# Patient Record
Sex: MALE | Race: WHITE | HISPANIC OR LATINO | Employment: UNEMPLOYED | URBAN - METROPOLITAN AREA
[De-identification: names, ages, dates, MRNs, and addresses within clinical notes are randomized per-mention and may not be internally consistent; named-entity substitution may affect disease eponyms.]

---

## 2019-05-17 ENCOUNTER — HOSPITAL ENCOUNTER (EMERGENCY)
Facility: MEDICAL CENTER | Age: 24
End: 2019-05-17
Attending: EMERGENCY MEDICINE
Payer: MEDICAID

## 2019-05-17 VITALS
HEART RATE: 80 BPM | DIASTOLIC BLOOD PRESSURE: 82 MMHG | WEIGHT: 163.14 LBS | BODY MASS INDEX: 24.16 KG/M2 | OXYGEN SATURATION: 94 % | RESPIRATION RATE: 16 BRPM | TEMPERATURE: 98.6 F | HEIGHT: 69 IN | SYSTOLIC BLOOD PRESSURE: 115 MMHG

## 2019-05-17 DIAGNOSIS — R30.0 DYSURIA: ICD-10-CM

## 2019-05-17 DIAGNOSIS — Z20.2 STD EXPOSURE: ICD-10-CM

## 2019-05-17 PROCEDURE — 96372 THER/PROPH/DIAG INJ SC/IM: CPT

## 2019-05-17 PROCEDURE — 700102 HCHG RX REV CODE 250 W/ 637 OVERRIDE(OP): Performed by: EMERGENCY MEDICINE

## 2019-05-17 PROCEDURE — 700101 HCHG RX REV CODE 250: Performed by: EMERGENCY MEDICINE

## 2019-05-17 PROCEDURE — A9270 NON-COVERED ITEM OR SERVICE: HCPCS | Performed by: EMERGENCY MEDICINE

## 2019-05-17 PROCEDURE — 700111 HCHG RX REV CODE 636 W/ 250 OVERRIDE (IP): Performed by: EMERGENCY MEDICINE

## 2019-05-17 PROCEDURE — 87591 N.GONORRHOEAE DNA AMP PROB: CPT

## 2019-05-17 PROCEDURE — 99284 EMERGENCY DEPT VISIT MOD MDM: CPT

## 2019-05-17 PROCEDURE — 87491 CHLMYD TRACH DNA AMP PROBE: CPT

## 2019-05-17 RX ORDER — CEFTRIAXONE SODIUM 250 MG/1
250 INJECTION, POWDER, FOR SOLUTION INTRAMUSCULAR; INTRAVENOUS ONCE
Status: COMPLETED | OUTPATIENT
Start: 2019-05-17 | End: 2019-05-17

## 2019-05-17 RX ORDER — METRONIDAZOLE 500 MG/1
2000 TABLET ORAL ONCE
Status: COMPLETED | OUTPATIENT
Start: 2019-05-17 | End: 2019-05-17

## 2019-05-17 RX ORDER — AZITHROMYCIN 250 MG/1
1000 TABLET, FILM COATED ORAL ONCE
Status: COMPLETED | OUTPATIENT
Start: 2019-05-17 | End: 2019-05-17

## 2019-05-17 RX ADMIN — CEFTRIAXONE SODIUM 250 MG: 250 INJECTION, POWDER, FOR SOLUTION INTRAMUSCULAR; INTRAVENOUS at 18:53

## 2019-05-17 RX ADMIN — METRONIDAZOLE 2000 MG: 500 TABLET, FILM COATED ORAL at 18:53

## 2019-05-17 RX ADMIN — LIDOCAINE HYDROCHLORIDE 0.9 ML: 10 INJECTION, SOLUTION INFILTRATION; PERINEURAL at 18:54

## 2019-05-17 RX ADMIN — AZITHROMYCIN 1000 MG: 250 TABLET, FILM COATED ORAL at 18:53

## 2019-05-17 ASSESSMENT — LIFESTYLE VARIABLES: DO YOU DRINK ALCOHOL: NO

## 2019-05-18 LAB
C TRACH DNA SPEC QL NAA+PROBE: NEGATIVE
N GONORRHOEA DNA SPEC QL NAA+PROBE: NEGATIVE
SPECIMEN SOURCE: NORMAL

## 2019-05-18 NOTE — ED TRIAGE NOTES
Chief Complaint   Patient presents with   • Exposure to STD     Pt reports to have been exposed to STD 1 wk ago, painful urination today   • Dysuria       Explained to pt triage process, made pt aware to tell this RN/staff of any changes/concerns, pt verbalized understanding of process and instructions given. Pt to [facility].
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
## 2019-05-29 ENCOUNTER — APPOINTMENT (OUTPATIENT)
Dept: RADIOLOGY | Facility: MEDICAL CENTER | Age: 24
End: 2019-05-29
Attending: EMERGENCY MEDICINE
Payer: MEDICAID

## 2019-05-29 ENCOUNTER — HOSPITAL ENCOUNTER (EMERGENCY)
Facility: MEDICAL CENTER | Age: 24
End: 2019-05-30
Attending: EMERGENCY MEDICINE
Payer: MEDICAID

## 2019-05-29 VITALS
DIASTOLIC BLOOD PRESSURE: 98 MMHG | HEART RATE: 72 BPM | TEMPERATURE: 98.8 F | WEIGHT: 165 LBS | OXYGEN SATURATION: 96 % | HEIGHT: 69 IN | RESPIRATION RATE: 16 BRPM | SYSTOLIC BLOOD PRESSURE: 137 MMHG | BODY MASS INDEX: 24.44 KG/M2

## 2019-05-29 DIAGNOSIS — S00.83XA TRAUMATIC HEMATOMA OF FOREHEAD, INITIAL ENCOUNTER: ICD-10-CM
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
## 2019-05-29 DIAGNOSIS — S09.90XA CLOSED HEAD INJURY, INITIAL ENCOUNTER: ICD-10-CM

## 2019-05-29 DIAGNOSIS — S93.401A SPRAIN OF RIGHT ANKLE, UNSPECIFIED LIGAMENT, INITIAL ENCOUNTER: ICD-10-CM

## 2019-05-29 PROCEDURE — 99285 EMERGENCY DEPT VISIT HI MDM: CPT

## 2019-05-29 PROCEDURE — 70450 CT HEAD/BRAIN W/O DYE: CPT

## 2019-05-29 PROCEDURE — 700102 HCHG RX REV CODE 250 W/ 637 OVERRIDE(OP): Performed by: EMERGENCY MEDICINE

## 2019-05-29 PROCEDURE — A9270 NON-COVERED ITEM OR SERVICE: HCPCS | Performed by: EMERGENCY MEDICINE

## 2019-05-29 PROCEDURE — 73610 X-RAY EXAM OF ANKLE: CPT | Mod: RT

## 2019-05-29 RX ORDER — IBUPROFEN 600 MG/1
600 TABLET ORAL ONCE
Status: COMPLETED | OUTPATIENT
Start: 2019-05-29 | End: 2019-05-29

## 2019-05-29 RX ORDER — CYCLOBENZAPRINE HCL 10 MG
10 TABLET ORAL 3 TIMES DAILY PRN
Qty: 15 TAB | Refills: 0 | Status: SHIPPED | OUTPATIENT
Start: 2019-05-29 | End: 2019-06-08

## 2019-05-29 RX ORDER — LORATADINE 10 MG/1
10 TABLET ORAL DAILY
COMMUNITY

## 2019-05-29 RX ORDER — CYCLOBENZAPRINE HCL 10 MG
10 TABLET ORAL ONCE
Status: COMPLETED | OUTPATIENT
Start: 2019-05-29 | End: 2019-05-29

## 2019-05-29 RX ORDER — IBUPROFEN 600 MG/1
600 TABLET ORAL EVERY 6 HOURS PRN
Qty: 20 TAB | Refills: 0 | Status: SHIPPED | OUTPATIENT
Start: 2019-05-29

## 2019-05-29 RX ADMIN — CYCLOBENZAPRINE HYDROCHLORIDE 10 MG: 10 TABLET, FILM COATED ORAL at 22:34

## 2019-05-29 RX ADMIN — IBUPROFEN 600 MG: 600 TABLET ORAL at 22:34

## 2019-05-30 PROCEDURE — 99285 EMERGENCY DEPT VISIT HI MDM: CPT

## 2019-05-30 NOTE — ED NOTES
D/c inst reviewed w/the pt to include pain management ice/heat, rx x 2.  The pt was given a work note for 3 days.  The pt asked for crutches and an ace wrap.  Provided.  The pt also asked for a w/c out.  Provided. The pt was asst out to the pov in a w/c.
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
## 2019-06-08 ENCOUNTER — HOSPITAL ENCOUNTER (EMERGENCY)
Facility: MEDICAL CENTER | Age: 24
End: 2019-06-08
Attending: EMERGENCY MEDICINE
Payer: MEDICAID

## 2019-06-08 VITALS
DIASTOLIC BLOOD PRESSURE: 85 MMHG | HEIGHT: 69 IN | HEART RATE: 84 BPM | WEIGHT: 163.14 LBS | OXYGEN SATURATION: 99 % | SYSTOLIC BLOOD PRESSURE: 129 MMHG | BODY MASS INDEX: 24.16 KG/M2 | TEMPERATURE: 97.8 F | RESPIRATION RATE: 16 BRPM

## 2019-06-08 DIAGNOSIS — N39.0 ACUTE UTI: ICD-10-CM

## 2019-06-08 DIAGNOSIS — N34.2 URETHRITIS: ICD-10-CM

## 2019-06-08 LAB
APPEARANCE UR: CLEAR
BACTERIA #/AREA URNS HPF: ABNORMAL /HPF
BILIRUB UR QL STRIP.AUTO: NEGATIVE
COLOR UR: YELLOW
GLUCOSE UR STRIP.AUTO-MCNC: NEGATIVE MG/DL
KETONES UR STRIP.AUTO-MCNC: NEGATIVE MG/DL
LEUKOCYTE ESTERASE UR QL STRIP.AUTO: NEGATIVE
MICRO URNS: ABNORMAL
MUCOUS THREADS #/AREA URNS HPF: ABNORMAL /HPF
NITRITE UR QL STRIP.AUTO: NEGATIVE
PH UR STRIP.AUTO: 6 [PH]
PROT UR QL STRIP: NEGATIVE MG/DL
RBC # URNS HPF: ABNORMAL /HPF
RBC UR QL AUTO: ABNORMAL
SP GR UR STRIP.AUTO: 1.02
WBC #/AREA URNS HPF: ABNORMAL /HPF

## 2019-06-08 PROCEDURE — 700111 HCHG RX REV CODE 636 W/ 250 OVERRIDE (IP): Performed by: EMERGENCY MEDICINE

## 2019-06-08 PROCEDURE — 81001 URINALYSIS AUTO W/SCOPE: CPT

## 2019-06-08 PROCEDURE — 700102 HCHG RX REV CODE 250 W/ 637 OVERRIDE(OP): Performed by: EMERGENCY MEDICINE

## 2019-06-08 PROCEDURE — 96372 THER/PROPH/DIAG INJ SC/IM: CPT

## 2019-06-08 PROCEDURE — A9270 NON-COVERED ITEM OR SERVICE: HCPCS | Performed by: EMERGENCY MEDICINE

## 2019-06-08 PROCEDURE — 99284 EMERGENCY DEPT VISIT MOD MDM: CPT

## 2019-06-08 PROCEDURE — 87591 N.GONORRHOEAE DNA AMP PROB: CPT

## 2019-06-08 PROCEDURE — 87491 CHLMYD TRACH DNA AMP PROBE: CPT

## 2019-06-08 RX ORDER — SULFAMETHOXAZOLE AND TRIMETHOPRIM 800; 160 MG/1; MG/1
1 TABLET ORAL 2 TIMES DAILY
Qty: 20 TAB | Refills: 0 | Status: SHIPPED | OUTPATIENT
Start: 2019-06-08 | End: 2019-06-18

## 2019-06-08 RX ORDER — CEFTRIAXONE 500 MG/1
250 INJECTION, POWDER, FOR SOLUTION INTRAMUSCULAR; INTRAVENOUS ONCE
Status: COMPLETED | OUTPATIENT
Start: 2019-06-08 | End: 2019-06-08

## 2019-06-08 RX ORDER — AZITHROMYCIN 250 MG/1
1000 TABLET, FILM COATED ORAL ONCE
Status: COMPLETED | OUTPATIENT
Start: 2019-06-08 | End: 2019-06-08

## 2019-06-08 RX ADMIN — CEFTRIAXONE SODIUM 250 MG: 500 INJECTION, POWDER, FOR SOLUTION INTRAMUSCULAR; INTRAVENOUS at 11:52

## 2019-06-08 RX ADMIN — AZITHROMYCIN 1000 MG: 250 TABLET, FILM COATED ORAL at 11:53

## 2019-06-08 ASSESSMENT — LIFESTYLE VARIABLES: DO YOU DRINK ALCOHOL: NO

## 2019-06-08 NOTE — ED NOTES
Discharge information reviewed in detail. Patient verbalized understanding of discharge instructions to follow up with [facility] and to return to ER if condition worsens.   Patient educated on one new prescription(s). Self to drive home.   Patient ambulated out of ER in a steady gait.

## 2019-06-08 NOTE — ED NOTES
"This pt was seen at [facility] the 17 th of last month and treated for STD exposure.  He returns today complaining of continued penile pain and discharge.   Chief Complaint   Patient presents with   • Exposure to STD     /87   Pulse (!) 101   Temp 36.9 °C (98.4 °F) (Temporal)   Resp 18   Ht 1.753 m (5' 9\")   Wt 74 kg (163 lb 2.3 oz)   SpO2 97%   BMI 24.09 kg/m²     "

## 2019-06-08 NOTE — ED PROVIDER NOTES
"ED Provider Note    CHIEF COMPLAINT  Chief Complaint   Patient presents with   • Exposure to STD       HPI  Babak Barnhart is a 24 y.o. male who presents for evaluation of dysuria and penile discharge, initially had the symptoms earlier last month, he came the emergency department and was treated for GC/chlamydia but ultimately had that test come back negative.  Since then he has engaged in unprotected intercourse with a new female partner, he states that his symptoms never fully resolved although did improve significantly only to worsen again.  No abdominal pain, no fever.  He offers no other complaints    REVIEW OF SYSTEMS  Negative for fever, abdominal pain.    PAST MEDICAL HISTORY       SOCIAL HISTORY  Social History     Social History Main Topics   • Smoking status: Never Smoker   • Smokeless tobacco: Never Used   • Alcohol use Yes      Comment: Occasionally   • Drug use: No   • Sexual activity: Not on file       SURGICAL HISTORY  patient denies any surgical history    CURRENT MEDICATIONS  I personally reviewed the medication list in the charting documentation.     ALLERGIES  Allergies   Allergen Reactions   • Amoxicillin Hives     Hives on his hands        PHYSICAL EXAM  VITAL SIGNS: /87   Pulse (!) 101   Temp 36.9 °C (98.4 °F) (Temporal)   Resp 18   Ht 1.753 m (5' 9\")   Wt 74 kg (163 lb 2.3 oz)   SpO2 97%   BMI 24.09 kg/m²   Constitutional: Alert in no apparent distress.  HENT: No signs of trauma.   Eyes: Conjunctiva normal, Non-icteric.   Chest: Normal nonlabored respirations  Skin: No erythema, No rash.   : Unremarkable external genitalia  Musculoskeletal: Good range of motion in all major joints.   Neurologic: Alert, No focal deficits noted.   Psychiatric: Affect normal, Judgment normal.    DIAGNOSTIC STUDIES / PROCEDURES    LABS/EKGs  Results for orders placed or performed during the hospital encounter of 06/08/19   URINALYSIS,CULTURE IF INDICATED   Result Value Ref Range    " Color Yellow     Character Clear     Specific Gravity 1.020 <1.035    Ph 6.0 5.0 - 8.0    Glucose Negative Negative mg/dL    Ketones Negative Negative mg/dL    Protein Negative Negative mg/dL    Bilirubin Negative Negative    Nitrite Negative Negative    Leukocyte Esterase Negative Negative    Occult Blood Trace (A) Negative    Micro Urine Req Microscopic    URINE MICROSCOPIC (W/UA)   Result Value Ref Range    WBC 5-10 (A) /hpf    RBC 2-5 (A) /hpf    Bacteria Few (A) None /hpf    Mucous Threads Few /hpf   Chlamydia/GC PCR Urine Or Swab   Result Value Ref Range    Source Urine         COURSE & MEDICAL DECISION MAKING  Pertinent Labs & Imaging studies reviewed. (See chart for details)    Encounter Summary: This is a 24 y.o. male with a return of his dysuria and penile discharge, was tested negative for GC/chlamydia last month and was treated, states improvement in his symptoms only to return again but he has been engaging in unprotected intercourse with new female partners.  At this point I will treat him again, test him again as well as expand the urine test to include a full urinalysis to rule out simple cystitis as well.  Urinalysis is concerning for UTI with elevated W BC's and some bacteria, will be treated with Bactrim, he will be instructed to follow-up with the urologist if he does not improve.  Strict return instructions discussed.      DISPOSITION: Discharge Home      FINAL IMPRESSION  1. Urethritis    2. Acute UTI        This dictation was created using voice recognition software. The accuracy of the dictation is limited to the abilities of the software. I expect there may be some errors of grammar and possibly content. The nursing notes were reviewed and certain aspects of this information were incorporated into this note.    Electronically signed by: Juanjose Menendez, 6/8/2019 11:21 AM

## 2020-03-01 ENCOUNTER — HOSPITAL ENCOUNTER (EMERGENCY)
Facility: MEDICAL CENTER | Age: 25
End: 2020-03-01
Attending: EMERGENCY MEDICINE
Payer: MEDICAID

## 2020-03-01 VITALS
WEIGHT: 165.34 LBS | RESPIRATION RATE: 16 BRPM | BODY MASS INDEX: 23.67 KG/M2 | HEART RATE: 74 BPM | DIASTOLIC BLOOD PRESSURE: 76 MMHG | OXYGEN SATURATION: 99 % | SYSTOLIC BLOOD PRESSURE: 137 MMHG | TEMPERATURE: 97.9 F | HEIGHT: 70 IN

## 2020-03-01 DIAGNOSIS — Z20.2 STD EXPOSURE: ICD-10-CM

## 2020-03-01 DIAGNOSIS — R30.0 DYSURIA: ICD-10-CM

## 2020-03-01 LAB
APPEARANCE UR: ABNORMAL
BACTERIA #/AREA URNS HPF: ABNORMAL /HPF
BILIRUB UR QL STRIP.AUTO: NEGATIVE
COLOR UR: YELLOW
GLUCOSE UR STRIP.AUTO-MCNC: NEGATIVE MG/DL
KETONES UR STRIP.AUTO-MCNC: NEGATIVE MG/DL
LEUKOCYTE ESTERASE UR QL STRIP.AUTO: ABNORMAL
MICRO URNS: ABNORMAL
MUCOUS THREADS #/AREA URNS HPF: ABNORMAL /HPF
NITRITE UR QL STRIP.AUTO: NEGATIVE
PH UR STRIP.AUTO: 7 [PH] (ref 5–8)
PROT UR QL STRIP: NEGATIVE MG/DL
RBC # URNS HPF: ABNORMAL /HPF
RBC UR QL AUTO: NEGATIVE
SP GR UR STRIP.AUTO: 1.02
WBC #/AREA URNS HPF: ABNORMAL /HPF

## 2020-03-01 PROCEDURE — 87591 N.GONORRHOEAE DNA AMP PROB: CPT

## 2020-03-01 PROCEDURE — 700101 HCHG RX REV CODE 250

## 2020-03-01 PROCEDURE — 700111 HCHG RX REV CODE 636 W/ 250 OVERRIDE (IP): Performed by: EMERGENCY MEDICINE

## 2020-03-01 PROCEDURE — 99284 EMERGENCY DEPT VISIT MOD MDM: CPT

## 2020-03-01 PROCEDURE — 700102 HCHG RX REV CODE 250 W/ 637 OVERRIDE(OP): Performed by: EMERGENCY MEDICINE

## 2020-03-01 PROCEDURE — 81001 URINALYSIS AUTO W/SCOPE: CPT

## 2020-03-01 PROCEDURE — 96372 THER/PROPH/DIAG INJ SC/IM: CPT

## 2020-03-01 PROCEDURE — 87491 CHLMYD TRACH DNA AMP PROBE: CPT

## 2020-03-01 PROCEDURE — A9270 NON-COVERED ITEM OR SERVICE: HCPCS | Performed by: EMERGENCY MEDICINE

## 2020-03-01 RX ORDER — LIDOCAINE HYDROCHLORIDE 10 MG/ML
INJECTION, SOLUTION INFILTRATION; PERINEURAL
Status: COMPLETED
Start: 2020-03-01 | End: 2020-03-01

## 2020-03-01 RX ORDER — CEFTRIAXONE 500 MG/1
250 INJECTION, POWDER, FOR SOLUTION INTRAMUSCULAR; INTRAVENOUS ONCE
Status: COMPLETED | OUTPATIENT
Start: 2020-03-01 | End: 2020-03-01

## 2020-03-01 RX ORDER — AZITHROMYCIN 250 MG/1
1000 TABLET, FILM COATED ORAL ONCE
Status: COMPLETED | OUTPATIENT
Start: 2020-03-01 | End: 2020-03-01

## 2020-03-01 RX ADMIN — CEFTRIAXONE SODIUM 250 MG: 500 INJECTION, POWDER, FOR SOLUTION INTRAMUSCULAR; INTRAVENOUS at 14:30

## 2020-03-01 RX ADMIN — AZITHROMYCIN 1000 MG: 250 TABLET, FILM COATED ORAL at 14:30

## 2020-03-01 RX ADMIN — LIDOCAINE HYDROCHLORIDE 1 ML: 10 INJECTION, SOLUTION INFILTRATION; PERINEURAL at 14:30

## 2020-03-01 NOTE — ED TRIAGE NOTES
"Present with a hx of dysuria, and penile discharge (yellow) recurring for the past 2 days.  Chief Complaint   Patient presents with   • Exposure to STD   • Penile Discharge   • Painful Urination     /83   Pulse 68   Temp 36.6 °C (97.9 °F)   Resp 16   Ht 1.778 m (5' 10\")   Wt 75 kg (165 lb 5.5 oz)   BMI 23.72 kg/m²     "

## 2020-03-01 NOTE — LETTER
3/4/2020               Babak Remberto RoeAntwanshlomo Barnhart  [address] [place] [address]   [facility] [address]        Dear Babak (MR#[id number])    As we have been unable to contact you by phone, this letter is sent in regards to your, recent visit to the [facility] Emergency Department on 3/1/2020.  During the visit, tests were performed to assist the physician in a medical diagnosis.  A review of those tests requires that we notify you of the following:    Your culture test was positive for Gonorrhea, a sexually transmitted infection. This was already treated appropriately in the Emergency Department. .       Based on the above findings it is recommended that you avoid sexual activity until 7 days after treatment and seek testing for the presence of additional sexually transmitted infections from the Health Department. Also, it is advised that you inform your sexual partner(s) within the previous 60 days of the above findings and direct them to the Health Department for testing. Should your symptoms progress, it is important that you follow up with your primary care physician, your local urgent care office, or return to the emergency department for further work up in order to prevent long term health issues.      Thank you for your cooperation in the matter.    Sincerely,  ED Culture Follow-Up Staff  Tasha Werner, PharmD, PharmD    [facility], Emergency Department  [address], [place] [address] [phone] (ED Culture Line)  [phone]

## 2020-03-01 NOTE — DISCHARGE INSTRUCTIONS
Make sure you wear a condom when having sex.  I advise following up with the health department to make sure the infection has completely resolved.  Any persistent burning, develop a rash or sores or any other concerns return.  Your syphilis test is pending and we will contact you if you have abnormal results.
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
## 2020-03-02 LAB
C TRACH DNA SPEC QL NAA+PROBE: NEGATIVE
N GONORRHOEA DNA SPEC QL NAA+PROBE: POSITIVE
SPECIMEN SOURCE: ABNORMAL

## 2020-03-04 NOTE — ED NOTES
"ED Positive Culture Follow-up/Notification Note:    Date: 3/3/2020     Patient seen in the ED on 3/1/2020 for dysuria, penile discharge.   1. STD exposure    2. Dysuria       Discharge Medication List as of 3/1/2020  3:16 PM      Azithromycin and ceftriaxone administered in the ED.     Allergies: Amoxicillin     Vitals:    03/01/20 1342 03/01/20 1344 03/01/20 1500   BP:  131/83 137/76   Pulse:  68 74   Resp:  16 16   Temp:  36.6 °C (97.9 °F)    SpO2:  98% 99%   Weight: 75 kg (165 lb 5.5 oz)     Height: 1.778 m (5' 10\")         Final cultures:   Results     Procedure Component Value Units Date/Time    Chlamydia/GC PCR Urine Or Swab [[id number]]  (Abnormal) Collected:  03/01/20 1415    Order Status:  Completed Specimen:  Genital from Urine, First Catch Updated:  03/02/20 1829     C. trachomatis by PCR Negative     N. gonorrhoeae by PCR POSITIVE     Source Urine    URINALYSIS [[id number]]  (Abnormal) Collected:  03/01/20 1415    Order Status:  Completed Specimen:  Urine Updated:  03/01/20 1509     Color Yellow     Character Sl Cloudy     Specific Gravity 1.020     Ph 7.0     Glucose Negative mg/dL      Ketones Negative mg/dL      Protein Negative mg/dL      Bilirubin Negative     Nitrite Negative     Leukocyte Esterase Trace     Occult Blood Negative     Micro Urine Req Microscopic          Plan:   Patient already treated for Gonorrhea in the ED with ceftriaxone. Unable to reach patient by phone, sent letter to inform of result,  on obtaining additional screening, notifying partners within the past 60 days and abstinence until 7 days after treatment.     Tasha eWrner, PharmD    "

## 2020-05-17 ENCOUNTER — HOSPITAL ENCOUNTER (EMERGENCY)
Facility: MEDICAL CENTER | Age: 25
End: 2020-05-17
Attending: EMERGENCY MEDICINE
Payer: MEDICAID

## 2020-05-17 VITALS
HEART RATE: 71 BPM | HEIGHT: 69 IN | TEMPERATURE: 97.5 F | RESPIRATION RATE: 15 BRPM | BODY MASS INDEX: 24.49 KG/M2 | DIASTOLIC BLOOD PRESSURE: 91 MMHG | OXYGEN SATURATION: 98 % | WEIGHT: 165.34 LBS | SYSTOLIC BLOOD PRESSURE: 136 MMHG

## 2020-05-17 DIAGNOSIS — A64 SEXUALLY TRANSMITTED DISEASE: ICD-10-CM

## 2020-05-17 DIAGNOSIS — N34.2 URETHRITIS: ICD-10-CM

## 2020-05-17 PROCEDURE — 99283 EMERGENCY DEPT VISIT LOW MDM: CPT

## 2020-05-17 PROCEDURE — 87591 N.GONORRHOEAE DNA AMP PROB: CPT

## 2020-05-17 PROCEDURE — 87491 CHLMYD TRACH DNA AMP PROBE: CPT

## 2020-05-17 PROCEDURE — 700102 HCHG RX REV CODE 250 W/ 637 OVERRIDE(OP): Performed by: EMERGENCY MEDICINE

## 2020-05-17 PROCEDURE — 700111 HCHG RX REV CODE 636 W/ 250 OVERRIDE (IP): Performed by: EMERGENCY MEDICINE

## 2020-05-17 PROCEDURE — 700101 HCHG RX REV CODE 250

## 2020-05-17 PROCEDURE — A9270 NON-COVERED ITEM OR SERVICE: HCPCS | Performed by: EMERGENCY MEDICINE

## 2020-05-17 PROCEDURE — 96372 THER/PROPH/DIAG INJ SC/IM: CPT

## 2020-05-17 RX ORDER — AZITHROMYCIN 250 MG/1
1000 TABLET, FILM COATED ORAL ONCE
Status: COMPLETED | OUTPATIENT
Start: 2020-05-17 | End: 2020-05-17

## 2020-05-17 RX ORDER — CEFTRIAXONE 1 G/1
250 INJECTION, POWDER, FOR SOLUTION INTRAMUSCULAR; INTRAVENOUS ONCE
Status: COMPLETED | OUTPATIENT
Start: 2020-05-17 | End: 2020-05-17

## 2020-05-17 RX ORDER — LIDOCAINE HYDROCHLORIDE 10 MG/ML
INJECTION, SOLUTION INFILTRATION; PERINEURAL
Status: COMPLETED
Start: 2020-05-17 | End: 2020-05-17

## 2020-05-17 RX ADMIN — LIDOCAINE HYDROCHLORIDE 2.1 ML: 10 INJECTION, SOLUTION INFILTRATION; PERINEURAL at 13:57

## 2020-05-17 RX ADMIN — AZITHROMYCIN 1000 MG: 250 TABLET, FILM COATED ORAL at 13:54

## 2020-05-17 RX ADMIN — CEFTRIAXONE SODIUM 250 MG: 1 INJECTION, POWDER, FOR SOLUTION INTRAMUSCULAR; INTRAVENOUS at 13:56

## 2020-05-17 NOTE — ED PROVIDER NOTES
ED Provider Note    CHIEF COMPLAINT  Chief Complaint   Patient presents with   • Painful Urination   • Penile Discharge       HPI  Babak Barnhart is a 25 y.o. male who presents for evaluation of penile discharge and dysuria.  The patient has a history of frequent unprotected sex.  Tested positive for gonorrhea within the last year.  He reports that this is unremarkable.  No high fevers rash or large joint arthralgia.  He has no other complaints no penile lesions no night sweats or weight loss.  No known history of HIV  REVIEW OF SYSTEMS  See HPI for further details.  No high fever chills night sweats or weight loss all other systems are negative.     PAST MEDICAL HISTORY  No past medical history on file.  .  Infective urethritis  FAMILY HISTORY  Noncontributory    SOCIAL HISTORY  Social History     Socioeconomic History   • Marital status: Single     Spouse name: Not on file   • Number of children: Not on file   • Years of education: Not on file   • Highest education level: Not on file   Occupational History   • Not on file   Social Needs   • Financial resource strain: Not on file   • Food insecurity     Worry: Not on file     Inability: Not on file   • Transportation needs     Medical: Not on file     Non-medical: Not on file   Tobacco Use   • Smoking status: Never Smoker   • Smokeless tobacco: Never Used   Substance and Sexual Activity   • Alcohol use: Yes     Comment: Occasionally   • Drug use: No   • Sexual activity: Not on file   Lifestyle   • Physical activity     Days per week: Not on file     Minutes per session: Not on file   • Stress: Not on file   Relationships   • Social connections     Talks on phone: Not on file     Gets together: Not on file     Attends Denominational service: Not on file     Active member of club or organization: Not on file     Attends meetings of clubs or organizations: Not on file     Relationship status: Not on file   • Intimate partner violence     Fear of current or ex  "partner: Not on file     Emotionally abused: Not on file     Physically abused: Not on file     Forced sexual activity: Not on file   Other Topics Concern   • Not on file   Social History Narrative   • Not on file     Sexually active without protection  SURGICAL HISTORY  No past surgical history on file.  Noncontributory  CURRENT MEDICATIONS  Home Medications    **Home medications have not yet been reviewed for this encounter**         ALLERGIES  Allergies   Allergen Reactions   • Amoxicillin Hives     Hives on his hands        PHYSICAL EXAM  VITAL SIGNS: /77   Pulse 73   Temp 36.6 °C (97.9 °F) (Temporal)   Resp 16   Ht 1.753 m (5' 9\")   Wt 75 kg (165 lb 5.5 oz)   SpO2 98%   BMI 24.42 kg/m²       Constitutional: Well developed, Well nourished, No acute distress, Non-toxic appearance.   HENT: Normocephalic, Atraumatic, Bilateral external ears normal, Oropharynx moist, No oral exudates, Nose normal.   Eyes: PERRLA, EOMI, Conjunctiva normal, No discharge.    Cardiovascular: Normal heart rate, Normal rhythm, No murmurs, No rubs, No gallops.   Thorax & Lungs: Normal breath sounds, No respiratory distress, No wheezing, No chest tenderness.   Abdomen: Bowel sounds normal, Soft, No tenderness, No masses, No pulsatile masses.   Skin: Warm, Dry, No erythema, No rash.   Back: No tenderness, No CVA tenderness.   Genitalia: No external lesions noted no lymphadenopathy purulent discharge coming from the urethra with no genital lesions   extremities: Intact distal pulses, No edema, No tenderness, No cyanosis, No clubbing.   Neurologic: Alert & oriented x 3, Normal motor function, Normal sensory function, No focal deficits noted.   Psychiatric: Anxious      COURSE & MEDICAL DECISION MAKING  Pertinent Labs & Imaging studies reviewed. (See chart for details)  Patient presents here with classic symptoms of urethritis likely gonorrhea or chlamydia.  He was given intramuscular Rocephin in the thousand milligrams of oral " azithromycin.  Urine gonorrhea and immediate testing is pending but it is apparently going to be positive.  I counseled the patient on file sexual practices    FINAL IMPRESSION  1.  Infective urethritis         Electronically signed by: Wily Palmer M.D., 5/17/2020 1:11 PM

## 2020-05-17 NOTE — ED TRIAGE NOTES
"Presents complaining of dysuria, and white/yellowish penile discharge recurring since yesterday.  He admits to unprotected sex 2 days ago.  He was seen in our department in March with similar symptomatology.  Chief Complaint   Patient presents with   • Painful Urination   • Penile Discharge       /77   Pulse 73   Temp 36.6 °C (97.9 °F) (Temporal)   Resp 16   Ht 1.753 m (5' 9\")   Wt 75 kg (165 lb 5.5 oz)   SpO2 98%   BMI 24.42 kg/m²     "

## 2020-05-17 NOTE — ED NOTES
Pt cleared for d/c  dischg instructions given to pt  He is aware to f/u w/ [facility] for further care and treatment if needed  D/c'ed to home in NAD

## 2020-05-17 NOTE — ED NOTES
Called pharmacy about pt's allergy to amoxicillin  Per [facility] pt has had med prior w/out incident   Pt and IM med given per order

## 2020-05-19 NOTE — ED NOTES
"ED Positive Culture Follow-up/Notification Note:    Date: 5/19/20     Patient seen in the ED on 5/17/2020 for Painful urination with discharge.   1. Sexually transmitted disease    2. Urethritis       Received ceftriaxone 250 mg IM x 1 dose and azithromycin 1,000 mg po x 1 dose in ED.    Discharge Medication List as of 5/17/2020  2:01 PM          Allergies: Amoxicillin     Vitals:    05/17/20 1236 05/17/20 1240 05/17/20 1406   BP:  128/77 136/91   Pulse:  73 71   Resp:  16 15   Temp:  36.6 °C (97.9 °F) 36.4 °C (97.5 °F)   TempSrc:  Temporal Temporal   SpO2:  98% 98%   Weight: 75 kg (165 lb 5.5 oz)     Height: 1.753 m (5' 9\")         Final cultures:   Results     Procedure Component Value Units Date/Time    Chlamydia/GC PCR Urine Or Swab [[id number]]  (Abnormal) Collected:  05/17/20 1255    Order Status:  Completed Specimen:  Urine, First Catch Updated:  05/18/20 1925     C. trachomatis by PCR Negative     N. gonorrhoeae by PCR POSITIVE     Source Urine          Plan:   Unable to reach patient via phone. Left voicemail for call back. Sent letter informing patient of being adequately treated for gonorrhea in the ED,  on obtaining additional screening, notifying partners within the past 60 days and abstinence until at least 7 days after treatment.    Howard Barreto, PharmD    "

## 2020-06-23 ENCOUNTER — HOSPITAL ENCOUNTER (EMERGENCY)
Facility: MEDICAL CENTER | Age: 25
End: 2020-06-23
Attending: EMERGENCY MEDICINE
Payer: MEDICAID

## 2020-06-23 VITALS
TEMPERATURE: 99 F | HEIGHT: 69 IN | BODY MASS INDEX: 25.11 KG/M2 | RESPIRATION RATE: 18 BRPM | HEART RATE: 85 BPM | SYSTOLIC BLOOD PRESSURE: 132 MMHG | OXYGEN SATURATION: 98 % | WEIGHT: 169.53 LBS | DIASTOLIC BLOOD PRESSURE: 88 MMHG

## 2020-06-23 DIAGNOSIS — Z20.2 EXPOSURE TO CHLAMYDIA: ICD-10-CM

## 2020-06-23 LAB
APPEARANCE UR: CLEAR
BILIRUB UR QL STRIP.AUTO: NEGATIVE
COLOR UR: YELLOW
GLUCOSE UR STRIP.AUTO-MCNC: NEGATIVE MG/DL
KETONES UR STRIP.AUTO-MCNC: NEGATIVE MG/DL
LEUKOCYTE ESTERASE UR QL STRIP.AUTO: NEGATIVE
MICRO URNS: NORMAL
NITRITE UR QL STRIP.AUTO: NEGATIVE
PH UR STRIP.AUTO: 7 [PH] (ref 5–8)
PROT UR QL STRIP: NEGATIVE MG/DL
RBC UR QL AUTO: NEGATIVE
SP GR UR STRIP.AUTO: 1.02

## 2020-06-23 PROCEDURE — 99284 EMERGENCY DEPT VISIT MOD MDM: CPT

## 2020-06-23 PROCEDURE — 81003 URINALYSIS AUTO W/O SCOPE: CPT

## 2020-06-23 PROCEDURE — A9270 NON-COVERED ITEM OR SERVICE: HCPCS | Performed by: EMERGENCY MEDICINE

## 2020-06-23 PROCEDURE — 700111 HCHG RX REV CODE 636 W/ 250 OVERRIDE (IP): Performed by: EMERGENCY MEDICINE

## 2020-06-23 PROCEDURE — 87491 CHLMYD TRACH DNA AMP PROBE: CPT

## 2020-06-23 PROCEDURE — 87591 N.GONORRHOEAE DNA AMP PROB: CPT

## 2020-06-23 PROCEDURE — 96372 THER/PROPH/DIAG INJ SC/IM: CPT

## 2020-06-23 PROCEDURE — 700102 HCHG RX REV CODE 250 W/ 637 OVERRIDE(OP): Performed by: EMERGENCY MEDICINE

## 2020-06-23 RX ORDER — CEFTRIAXONE 1 G/1
250 INJECTION, POWDER, FOR SOLUTION INTRAMUSCULAR; INTRAVENOUS ONCE
Status: COMPLETED | OUTPATIENT
Start: 2020-06-23 | End: 2020-06-23

## 2020-06-23 RX ORDER — AZITHROMYCIN 250 MG/1
1000 TABLET, FILM COATED ORAL ONCE
Status: COMPLETED | OUTPATIENT
Start: 2020-06-23 | End: 2020-06-23

## 2020-06-23 RX ADMIN — CEFTRIAXONE SODIUM 250 MG: 1 INJECTION, POWDER, FOR SOLUTION INTRAMUSCULAR; INTRAVENOUS at 18:13

## 2020-06-23 RX ADMIN — AZITHROMYCIN MONOHYDRATE 1000 MG: 250 TABLET ORAL at 18:11

## 2020-06-24 NOTE — ED PROVIDER NOTES
"ED Provider Note    CHIEF COMPLAINT  Chief Complaint   Patient presents with   • Exposure to STD     last night   • Penile Discharge       HPI  Babak Barnhart is a 25 y.o. male who presents with a chief complaint of chlamydia exposure.  Patient reportedly had unprotected intercourse with a female partner last night who called him earlier today and told him that she was just diagnosed with chlamydia.  He has scant penile discharge but otherwise denies any symptoms.  He was treated with antibiotics last month for similar complaint.  No fevers or chills.    REVIEW OF SYSTEMS  See HPI for further details.  Penile discharge.  Chlamydia exposure.  All other systems are negative.     PAST MEDICAL HISTORY       SOCIAL HISTORY  Social History     Tobacco Use   • Smoking status: Never Smoker   • Smokeless tobacco: Never Used   Substance and Sexual Activity   • Alcohol use: Yes     Comment: Occasionally   • Drug use: Yes     Types: Inhaled     Comment: marijuana   • Sexual activity: Not on file       SURGICAL HISTORY   has a past surgical history that includes other orthopedic surgery.    CURRENT MEDICATIONS  Home Medications    **Home medications have not yet been reviewed for this encounter**         ALLERGIES  Allergies   Allergen Reactions   • Amoxicillin Hives     Hives on his hands        PHYSICAL EXAM  VITAL SIGNS: /93   Pulse 88   Temp 37.2 °C (99 °F) (Temporal)   Resp 18   Ht 1.753 m (5' 9\")   Wt 76.9 kg (169 lb 8.5 oz)   SpO2 98%   BMI 25.04 kg/m²    Pulse ox interpretation: I interpret this pulse ox as normal.  Constitutional: Alert in no apparent distress.  HENT: No signs of trauma, Bilateral external ears normal, Nose normal.  Moist mucous membranes.  Eyes: Pupils are equal and reactive, Conjunctiva normal, Non-icteric.   Neck: Normal range of motion, No tenderness, Supple, No stridor.   Cardiovascular: Warm and well-perfused.  Thorax & Lungs: No respiratory distress.  : Normal external " male genitalia without penile discharge, lesions, testicular tenderness, testicular erythema.  Skin: Warm, Dry, No erythema, No rash.   Back: Normal alignment.  Musculoskeletal: Good range of motion in all major joints. No tenderness to palpation or major deformities noted.   Neurologic: Alert, Normal motor function, Normal sensory function, No focal deficits noted.   Psychiatric: Affect normal, Judgment normal, Mood normal.     DIAGNOSTIC STUDIES / PROCEDURES    LABS  UA  GC/chlamydia    COURSE & MEDICAL DECISION MAKING  Pertinent Labs & Imaging studies reviewed. (See chart for details)  26 yo male here requesting treatment for chlamydia and gonorrhea after having unprotected sex with a partner who later told him she was positive for chlamydia. Patient notes scant urethral d/c. Normal appearing genitalia. UA clean but will treat with ceftriaxone and azithromycin. Counseled extensively about using protection during intercourse as this is his 4th visit with similar complaint. F/U with PCP.    The patient will return for worsening symptoms and is stable at the time of discharge. The patient verbalizes understanding and will comply.    FINAL IMPRESSION  1. Exposure to chlamydia       Electronically signed by: Hugo Carlson M.D., 6/23/2020 5:48 PM

## 2020-06-24 NOTE — DISCHARGE INSTRUCTIONS
You were seen in the ER after having unprotected sexual intercourse with a partner who is known to be chlamydia positive.  We did treat you with appropriate antibiotics and have tested your urine, we will call you if it returns positive.  As we discussed in the ER, please use a condom every time you have sex with a partner and less you are in a monogamous relationship.  Follow-up with the primary care physician.  Return to the ER with any new or worsening symptoms.  Good luck, I hope you feel better soon!

## 2020-06-24 NOTE — ED NOTES
Pt to er for std check-states unprotected sex with partner + chlamydia and wants to get checked. In no distress in triage. Urine spec collected in triage. Pt denies known covid exposure or recent travel.   What Is The Reason For Today's Visit?: Full Body Skin Examination What Is The Reason For Today's Visit? (Being Monitored For X): concerning skin lesions on an annual basis

## 2020-08-02 ENCOUNTER — HOSPITAL ENCOUNTER (EMERGENCY)
Facility: MEDICAL CENTER | Age: 25
End: 2020-08-02
Attending: EMERGENCY MEDICINE
Payer: MEDICAID

## 2020-08-02 VITALS
HEART RATE: 111 BPM | WEIGHT: 172.4 LBS | TEMPERATURE: 101.4 F | BODY MASS INDEX: 25.53 KG/M2 | HEIGHT: 69 IN | SYSTOLIC BLOOD PRESSURE: 140 MMHG | OXYGEN SATURATION: 96 % | RESPIRATION RATE: 16 BRPM | DIASTOLIC BLOOD PRESSURE: 87 MMHG

## 2020-08-02 DIAGNOSIS — J02.9 PHARYNGITIS, UNSPECIFIED ETIOLOGY: ICD-10-CM

## 2020-08-02 LAB
COVID ORDER STATUS COVID19: NORMAL
S PYO AG THROAT QL: NORMAL
SIGNIFICANT IND 70042: NORMAL
SITE SITE: NORMAL
SOURCE SOURCE: NORMAL

## 2020-08-02 PROCEDURE — 87077 CULTURE AEROBIC IDENTIFY: CPT

## 2020-08-02 PROCEDURE — 87880 STREP A ASSAY W/OPTIC: CPT

## 2020-08-02 PROCEDURE — 99284 EMERGENCY DEPT VISIT MOD MDM: CPT

## 2020-08-02 PROCEDURE — 87081 CULTURE SCREEN ONLY: CPT

## 2020-08-02 PROCEDURE — A9270 NON-COVERED ITEM OR SERVICE: HCPCS | Performed by: EMERGENCY MEDICINE

## 2020-08-02 PROCEDURE — 700111 HCHG RX REV CODE 636 W/ 250 OVERRIDE (IP): Performed by: EMERGENCY MEDICINE

## 2020-08-02 PROCEDURE — U0003 INFECTIOUS AGENT DETECTION BY NUCLEIC ACID (DNA OR RNA); SEVERE ACUTE RESPIRATORY SYNDROME CORONAVIRUS 2 (SARS-COV-2) (CORONAVIRUS DISEASE [COVID-19]), AMPLIFIED PROBE TECHNIQUE, MAKING USE OF HIGH THROUGHPUT TECHNOLOGIES AS DESCRIBED BY CMS-2020-01-R: HCPCS

## 2020-08-02 PROCEDURE — 700102 HCHG RX REV CODE 250 W/ 637 OVERRIDE(OP): Performed by: EMERGENCY MEDICINE

## 2020-08-02 PROCEDURE — C9803 HOPD COVID-19 SPEC COLLECT: HCPCS | Performed by: EMERGENCY MEDICINE

## 2020-08-02 RX ORDER — IBUPROFEN 600 MG/1
600 TABLET ORAL ONCE
Status: COMPLETED | OUTPATIENT
Start: 2020-08-02 | End: 2020-08-02

## 2020-08-02 RX ORDER — DEXAMETHASONE SODIUM PHOSPHATE 10 MG/ML
16 INJECTION, SOLUTION INTRAMUSCULAR; INTRAVENOUS ONCE
Status: COMPLETED | OUTPATIENT
Start: 2020-08-02 | End: 2020-08-02

## 2020-08-02 RX ORDER — ACETAMINOPHEN 325 MG/1
975 TABLET ORAL ONCE
Status: COMPLETED | OUTPATIENT
Start: 2020-08-02 | End: 2020-08-02

## 2020-08-02 RX ADMIN — ACETAMINOPHEN 975 MG: 325 TABLET, FILM COATED ORAL at 10:58

## 2020-08-02 RX ADMIN — DEXAMETHASONE SODIUM PHOSPHATE 16 MG: 10 INJECTION INTRAMUSCULAR; INTRAVENOUS at 10:58

## 2020-08-02 RX ADMIN — IBUPROFEN 600 MG: 600 TABLET ORAL at 10:58

## 2020-08-02 NOTE — ED NOTES
Assumed care of pt-pt rounding and ice chips provided per pt request. Results noted by erp-for d/c. Pt aware

## 2020-08-02 NOTE — DISCHARGE INSTRUCTIONS
Pharyngitis    Pharyngitis is redness, pain, and swelling (inflammation) of the throat (pharynx). It is a very common cause of sore throat. Pharyngitis can be caused by a bacteria, but it is usually caused by a virus. Most cases of pharyngitis get better on their own without treatment.  What are the causes?  This condition may be caused by:  · Infection by viruses (viral). Viral pharyngitis spreads from person to person (is contagious) through coughing, sneezing, and sharing of personal items or utensils such as cups, forks, spoons, and toothbrushes.  · Infection by bacteria (bacterial). Bacterial pharyngitis may be spread by touching the nose or face after coming in contact with the bacteria, or through more intimate contact, such as kissing.  · Allergies. Allergies can cause buildup of mucus in the throat (post-nasal drip), leading to inflammation and irritation. Allergies can also cause blocked nasal passages, forcing breathing through the mouth, which dries and irritates the throat.  What increases the risk?  You are more likely to develop this condition if:  · You are 5-24 years old.  · You are exposed to crowded environments such as , school, or dormitory living.  · You live in a cold climate.  · You have a weakened disease-fighting (immune) system.  What are the signs or symptoms?  Symptoms of this condition vary by the cause (viral, bacterial, or allergies) and can include:  · Sore throat.  · Fatigue.  · Low-grade fever.  · Headache.  · Joint pain and muscle aches.  · Skin rashes.  · Swollen glands in the throat (lymph nodes).  · Plaque-like film on the throat or tonsils. This is often a symptom of bacterial pharyngitis.  · Vomiting.  · Stuffy nose (nasal congestion).  · Cough.  · Red, itchy eyes (conjunctivitis).  · Loss of appetite.  How is this diagnosed?  This condition is often diagnosed based on your medical history and a physical exam. Your health care provider will ask you questions about your  illness and your symptoms. A swab of your throat may be done to check for bacteria (rapid strep test). Other lab tests may also be done, depending on the suspected cause, but these are rare.  How is this treated?  This condition usually gets better in 3-4 days without medicine. Bacterial pharyngitis may be treated with antibiotic medicines.  Follow these instructions at home:  · Take over-the-counter and prescription medicines only as told by your health care provider.  ? If you were prescribed an antibiotic medicine, take it as told by your health care provider. Do not stop taking the antibiotic even if you start to feel better.  ? Do not give children aspirin because of the association with Reye syndrome.  · Drink enough water and fluids to keep your urine clear or pale yellow.  · Get a lot of rest.  · Gargle with a salt-water mixture 3-4 times a day or as needed. To make a salt-water mixture, completely dissolve ½-1 tsp of salt in 1 cup of warm water.  · If your health care provider approves, you may use throat lozenges or sprays to soothe your throat.  Contact a health care provider if:  · You have large, tender lumps in your neck.  · You have a rash.  · You cough up green, yellow-brown, or bloody spit.  Get help right away if:  · Your neck becomes stiff.  · You drool or are unable to swallow liquids.  · You cannot drink or take medicines without vomiting.  · You have severe pain that does not go away, even after you take medicine.  · You have trouble breathing, and it is not caused by a stuffy nose.  · You have new pain and swelling in your joints such as the knees, ankles, wrists, or elbows.  Summary  · Pharyngitis is redness, pain, and swelling (inflammation) of the throat (pharynx).  · While pharyngitis can be caused by a bacteria, the most common causes are viral.  · Most cases of pharyngitis get better on their own without treatment.  · Bacterial pharyngitis is treated with antibiotic medicines.  This  information is not intended to replace advice given to you by your health care provider. Make sure you discuss any questions you have with your health care provider.  Document Released: 12/18/2006 Document Revised: 11/30/2018 Document Reviewed: 01/23/2018  Elsevier Patient Education © 2020 [employer]

## 2020-08-02 NOTE — ED NOTES
Dc instructions reviewed with pt-aware of need to f/u with pcp-if has none, establish with [facility] ( number provided), as well as hygiene r/t viral infections, use of otc meds (tylnol and motrin) for relief of s/s, will be contacted by [facility] if covid + return for worsening s/s

## 2020-08-03 LAB
SARS-COV-2 RNA RESP QL NAA+PROBE: NOTDETECTED
SPECIMEN SOURCE: NORMAL

## 2020-08-05 ENCOUNTER — HOSPITAL ENCOUNTER (EMERGENCY)
Facility: MEDICAL CENTER | Age: 25
End: 2020-08-05
Attending: EMERGENCY MEDICINE | Admitting: EMERGENCY MEDICINE
Payer: MEDICAID

## 2020-08-05 VITALS
HEART RATE: 78 BPM | SYSTOLIC BLOOD PRESSURE: 141 MMHG | OXYGEN SATURATION: 99 % | DIASTOLIC BLOOD PRESSURE: 90 MMHG | WEIGHT: 167.33 LBS | RESPIRATION RATE: 16 BRPM | TEMPERATURE: 98.3 F | BODY MASS INDEX: 24.78 KG/M2 | HEIGHT: 69 IN

## 2020-08-05 DIAGNOSIS — J02.0 STREP PHARYNGITIS: ICD-10-CM

## 2020-08-05 LAB
S PYO SPEC QL CULT: ABNORMAL
S PYO SPEC QL CULT: ABNORMAL
SIGNIFICANT IND 70042: ABNORMAL
SITE SITE: ABNORMAL
SOURCE SOURCE: ABNORMAL

## 2020-08-05 PROCEDURE — A9270 NON-COVERED ITEM OR SERVICE: HCPCS

## 2020-08-05 PROCEDURE — 700102 HCHG RX REV CODE 250 W/ 637 OVERRIDE(OP)

## 2020-08-05 PROCEDURE — A9270 NON-COVERED ITEM OR SERVICE: HCPCS | Performed by: EMERGENCY MEDICINE

## 2020-08-05 PROCEDURE — 99283 EMERGENCY DEPT VISIT LOW MDM: CPT

## 2020-08-05 PROCEDURE — 700102 HCHG RX REV CODE 250 W/ 637 OVERRIDE(OP): Performed by: EMERGENCY MEDICINE

## 2020-08-05 RX ORDER — DEXAMETHASONE 4 MG/1
8 TABLET ORAL ONCE
Status: COMPLETED | OUTPATIENT
Start: 2020-08-05 | End: 2020-08-05

## 2020-08-05 RX ORDER — ACETAMINOPHEN 500 MG
500 TABLET ORAL EVERY 6 HOURS PRN
COMMUNITY

## 2020-08-05 RX ORDER — CLINDAMYCIN HYDROCHLORIDE 300 MG/1
300 CAPSULE ORAL 3 TIMES DAILY
Qty: 30 CAP | Refills: 0 | Status: SHIPPED | OUTPATIENT
Start: 2020-08-05 | End: 2020-08-15

## 2020-08-05 RX ORDER — CLINDAMYCIN HYDROCHLORIDE 150 MG/1
300 CAPSULE ORAL ONCE
Status: COMPLETED | OUTPATIENT
Start: 2020-08-05 | End: 2020-08-05

## 2020-08-05 RX ORDER — CLINDAMYCIN HYDROCHLORIDE 150 MG/1
CAPSULE ORAL
Status: COMPLETED
Start: 2020-08-05 | End: 2020-08-05

## 2020-08-05 RX ADMIN — CLINDAMYCIN HYDROCHLORIDE 300 MG: 150 CAPSULE ORAL at 12:13

## 2020-08-05 RX ADMIN — DEXAMETHASONE 8 MG: 4 TABLET ORAL at 12:13

## 2020-08-05 NOTE — DISCHARGE INSTRUCTIONS
Take antibiotics as prescribed.  Drink daily fluids.  Take over-the-counter ibuprofen or Tylenol as directed for pain or fever.    Return for worsening sore throat, hip pain with opening her mouth, cannot tolerate fluids or do not improving in 2 days.  Follow-up with primary care.

## 2020-08-06 NOTE — ED NOTES
"ED Positive Culture Follow-up/Notification Note:    Date: 08/06/20     Patient seen in the ED on 8/2/2020 for sore throat, fever, body aches.   1. Pharyngitis, unspecified etiology       Discharge Medication List as of 8/2/2020 11:39 AM      Patient was given clindamycin 300 mg x1 in ed   Prescribed clindamycin 300 mg TID x 10 days    Allergies: Amoxicillin     Vitals:    08/02/20 1010 08/02/20 1102 08/02/20 1136   BP: 120/77 139/88 140/87   Pulse: (!) 109 (!) 112 (!) 111   Resp: 16     Temp: (!) 38.6 °C (101.4 °F)     TempSrc: Oral     SpO2: 97% 99% 96%   Weight: 78.2 kg (172 lb 6.4 oz)     Height: 1.753 m (5' 9\")         Final cultures:   Results     Procedure Component Value Units Date/Time    Beta Strep Screen (Gp. A) [[id number]]  (Abnormal) Collected: 08/02/20 1102    Order Status: Completed Specimen: Throat Updated: 08/05/20 0957     Significant Indicator POS     Source THRT     Site -     Culture Result No Beta Streptococci Group A isolated.      Beta Streptococcus Group C  Moderate growth      RAPID STREP,CULT IF INDICATED [[id number]] Collected: 08/02/20 1102    Order Status: Completed Specimen: Throat Updated: 08/05/20 0957     Significant Indicator NEG     Source THRT     Site -     Rapid Strep Screen Negative for Group A streptococcus.  A negative result may be obtained if the specimen is  inadequate or antigen concentration is below the  sensitivity of the test. This negative test will be followed  up with a culture as requested.      SARS-CoV-2, PCR (In-House) [[id number]] Collected: 08/02/20 1112    Order Status: Completed Updated: 08/03/20 0749     SARS-CoV-2 Source NP Swab     SARS-CoV-2 by PCR NotDetected     Comment: [facility] providers: PLEASE REFER TO DE-ESCALATION AND RETESTING PROTOCOL  on [email or web address]  **The TaqPath COVID-19 SARS-CoV-2 test has been made available for use under  the Emergency Use Authorization (EUA) only.         COVID/SARS CoV-2 PCR [[id number]] Collected: 08/02/20 1112    " Order Status: Completed Specimen: Respirate from Nasopharyngeal Updated: 08/02/20 1118     COVID Order Status Received     Comment: The order for SARS CoV-2 testing has been received by the  Laboratory. This result is neither positive nor negative.  Final results of testing will report in 24-48 hours, separately.         Group A Strep by PCR [[id number]] Collected: 08/02/20 0000    Order Status: Canceled Specimen: Throat           Plan:   Appropriate antibiotic therapy prescribed. No changes required based upon culture result.     Patient tested negative for COVID-19. I have informed the patient of the result by telephone. Encouraged to stay at home until no fever for 72 hours without the use of fever reducing medications and symptoms improving. Informed there is no need to further self-isolate for 14 days for COVID-19 unless otherwise directed by the Health Dept. Also informed patient of positive strep culture and encouraged compliance with antibiotics.      They are advised to return to the ER for worsening symptoms including difficulty breathing, ongoing fever, weakness or chest pain.    Bro Peace, PharmD

## 2020-12-29 NOTE — ED TRIAGE NOTES
"Bib self for above complaints.     Chief Complaint   Patient presents with   • Sore Throat     pt states he was here on the 2nd, tested for covid and strep, pt states still getting fevers depsite tylenol and ibuprofen. pt states white bumps in throat now and more red. pt states hard to eat or drink anything due to pain.      /75   Pulse 92   Temp 36.8 °C (98.2 °F) (Temporal)   Resp 16   Ht 1.753 m (5' 9\")   Wt 75.9 kg (167 lb 5.3 oz)   SpO2 97%   BMI 24.71 kg/m²     "
no